# Patient Record
Sex: FEMALE | Race: WHITE | Employment: PART TIME | URBAN - METROPOLITAN AREA
[De-identification: names, ages, dates, MRNs, and addresses within clinical notes are randomized per-mention and may not be internally consistent; named-entity substitution may affect disease eponyms.]

---

## 2019-04-11 LAB
ESTRADIOL LEVEL: 7 PG/ML
FOLLICLE STIMULATING HORMONE: 17.9 MIU/ML
HCT VFR BLD CALC: 40.4 % (ref 36–48)
HEMOGLOBIN: 13.6 G/DL (ref 12–16)
MCH RBC QN AUTO: 30.8 PG (ref 26–34)
MCHC RBC AUTO-ENTMCNC: 33.7 G/DL (ref 31–36)
MCV RBC AUTO: 91.4 FL (ref 80–100)
PDW BLD-RTO: 13.1 % (ref 12.4–15.4)
PLATELET # BLD: 350 K/UL (ref 135–450)
PMV BLD AUTO: 8.9 FL (ref 5–10.5)
RBC # BLD: 4.41 M/UL (ref 4–5.2)
TSH SERPL DL<=0.05 MIU/L-ACNC: 1.67 UIU/ML (ref 0.27–4.2)
WBC # BLD: 6.5 K/UL (ref 4–11)

## 2019-04-15 LAB
HPV COMMENT: NORMAL
HPV TYPE 16: NOT DETECTED
HPV TYPE 18: NOT DETECTED
HPVOH (OTHER TYPES): NOT DETECTED

## 2022-07-20 ENCOUNTER — TELEPHONE (OUTPATIENT)
Dept: PULMONOLOGY | Age: 52
End: 2022-07-20

## 2022-07-20 NOTE — TELEPHONE ENCOUNTER
Pt was dx'ed on Monday with COVID Current sx's are chest congestion, wheezing short of breath nasal drainage and coughing up yellow-green phlegm Been to ER twice in 10 days 2 rounds of antibiotics and steroids Unfortunately she has not been seen by us yet Suggested she call her PCP to see if she might be able to be prescribed Paxlovid

## 2022-08-03 ENCOUNTER — OFFICE VISIT (OUTPATIENT)
Dept: PULMONOLOGY | Age: 52
End: 2022-08-03
Payer: COMMERCIAL

## 2022-08-03 VITALS — OXYGEN SATURATION: 98 % | HEART RATE: 79 BPM

## 2022-08-03 DIAGNOSIS — R05.3 CHRONIC COUGH: ICD-10-CM

## 2022-08-03 DIAGNOSIS — Z86.16 HISTORY OF COVID-19: ICD-10-CM

## 2022-08-03 DIAGNOSIS — J45.41 MODERATE PERSISTENT ASTHMATIC BRONCHITIS WITH ACUTE EXACERBATION: ICD-10-CM

## 2022-08-03 DIAGNOSIS — J84.9 ILD (INTERSTITIAL LUNG DISEASE) (HCC): ICD-10-CM

## 2022-08-03 DIAGNOSIS — R06.02 SOB (SHORTNESS OF BREATH): ICD-10-CM

## 2022-08-03 PROBLEM — J45.901 ASTHMATIC BRONCHITIS WITH ACUTE EXACERBATION: Status: ACTIVE | Noted: 2022-08-03

## 2022-08-03 PROCEDURE — 4004F PT TOBACCO SCREEN RCVD TLK: CPT | Performed by: INTERNAL MEDICINE

## 2022-08-03 PROCEDURE — G8421 BMI NOT CALCULATED: HCPCS | Performed by: INTERNAL MEDICINE

## 2022-08-03 PROCEDURE — G8427 DOCREV CUR MEDS BY ELIG CLIN: HCPCS | Performed by: INTERNAL MEDICINE

## 2022-08-03 PROCEDURE — 3017F COLORECTAL CA SCREEN DOC REV: CPT | Performed by: INTERNAL MEDICINE

## 2022-08-03 PROCEDURE — 99204 OFFICE O/P NEW MOD 45 MIN: CPT | Performed by: INTERNAL MEDICINE

## 2022-08-03 RX ORDER — ALBUTEROL SULFATE 2.5 MG/3ML
2.5 SOLUTION RESPIRATORY (INHALATION) EVERY 6 HOURS PRN
COMMUNITY

## 2022-08-03 RX ORDER — ALBUTEROL SULFATE 90 UG/1
2 AEROSOL, METERED RESPIRATORY (INHALATION) EVERY 6 HOURS PRN
COMMUNITY

## 2022-08-03 RX ORDER — PAROXETINE HYDROCHLORIDE 40 MG/1
40 TABLET, FILM COATED ORAL EVERY MORNING
COMMUNITY

## 2022-08-03 RX ORDER — PREDNISONE 10 MG/1
10 TABLET ORAL DAILY
Qty: 30 TABLET | Refills: 11 | Status: SHIPPED | OUTPATIENT
Start: 2022-08-03 | End: 2022-08-13

## 2022-08-03 RX ORDER — BUPROPION HYDROCHLORIDE 150 MG/1
450 TABLET ORAL EVERY MORNING
COMMUNITY

## 2022-08-03 RX ORDER — FLUTICASONE FUROATE AND VILANTEROL 200; 25 UG/1; UG/1
1 POWDER RESPIRATORY (INHALATION) DAILY
Qty: 1 EACH | Refills: 5 | Status: SHIPPED | OUTPATIENT
Start: 2022-08-03

## 2022-08-03 ASSESSMENT — ENCOUNTER SYMPTOMS
SINUS PRESSURE: 0
DIARRHEA: 0
NAUSEA: 0
ABDOMINAL PAIN: 0
CONSTIPATION: 0

## 2022-08-03 NOTE — PROGRESS NOTES
[facility]  Consult Note  Hazel Whitlock MD       Vamsi Dockery   YOB: 1970    Date of Visit:  8/3/2022    Assessment/Plan:  1. SOB (shortness of breath) & 2. Chronic cough  3. History of COVID-19/ILD  4. Moderate persistent asthmatic bronchitis with acute exacerbation    I have reviewed chest x-ray imaging. My interpretation is that she has increased interstitial markings. With 3 episodes of COVID-19, I am concerned about the possibility of interstitial lung disease related to repeated COVID-19 infection  Obtain CT chest    I think her shortness of breath, cough and wheezing are an asthma-like syndrome likely related to her repeated COVID infections. Medication management:  Prednisone 10 mg daily  Breo 200, 1 puff daily  Continue these medications until she follows up here in 1 month        Chief Complaint   Patient presents with    Cough     Back after taking last steroid on Sunday Getting up green phlegm Using nebulizer twice a day and has rescue inhaler    Wheezing     Took her last steroid on Sunday and sx's returned that night        HPI  The patient presents with a complaint of shortness of breath since about 11/21 when she had COVID. After that she also had flu. She again tested + for COVID 2/22 and 7/22. Exertion such as walking up a flight of stairs or doing her hair is a modifying factor. She has associated cough productive of green/yellow sputum. She has associated wheezing. She has had multiple rounds of Abx and steroids. Steroids seem to help the wheezing and SOB. She has no prior history of asthma. She is a lifetime nonsmoker but has some secondary smoke exposure. Family history is + for asthma in her father. Review of Systems  Review of Systems   Constitutional:  Negative for fatigue and fever. HENT:  Negative for congestion and sinus pressure. Eyes:  Negative for visual disturbance.    Cardiovascular:  Negative for chest pain and palpitations. Gastrointestinal:  Negative for abdominal pain, constipation, diarrhea and nausea. Genitourinary:  Negative for difficulty urinating. Musculoskeletal:  Negative for arthralgias. Skin:  Negative for rash. Neurological:  Negative for dizziness and light-headedness. Hematological:  Does not bruise/bleed easily. Psychiatric/Behavioral:  Negative for behavioral problems. History  I have reviewed past medical, surgical, social and family history. This is documented elsewhere in themedical record. Physical Exam:  Physical Exam  Constitutional:       General: She is not in acute distress. Appearance: She is well-developed. HENT:      Head: Normocephalic and atraumatic. Eyes:      Comments: Nasal mucosa, teeth and gums and oropharynx are clear. Neck:      Thyroid: No thyromegaly (There are no other neck masses noted. ). Vascular: No JVD. Trachea: No tracheal deviation. Cardiovascular:      Rate and Rhythm: Normal rate and regular rhythm. Heart sounds: Normal heart sounds. No murmur heard. Pulmonary:      Effort: Pulmonary effort is normal. No respiratory distress. Breath sounds: Wheezing present. No rales. Chest:      Chest wall: No tenderness. Abdominal:      General: Bowel sounds are normal. There is no distension. Palpations: Abdomen is soft. Mass: There is no hepatosplenomegaly. Tenderness: There is no abdominal tenderness. Musculoskeletal:         General: No tenderness (Muscle strength is normal and there is no obvious atrophy). Cervical back: Neck supple. Lymphadenopathy:      Cervical: No cervical adenopathy. Skin:     General: Skin is warm and dry. Findings: No rash. Psychiatric:      Comments: Oriented to person place and time       Allergies   Allergen Reactions    Bactrim [Sulfamethoxazole-Trimethoprim]      Prior to Visit Medications    Medication Sig Taking?  Authorizing Provider   buPROPion [facility] XL) 150 MG extended release tablet Take 450 mg by mouth every morning Yes Historical Provider, MD   PARoxetine (PAXIL) 40 MG tablet Take 40 mg by mouth every morning Yes Historical Provider, MD   albuterol (PROVENTIL) (2.5 MG/3ML) 0.083% nebulizer solution Take 2.5 mg by nebulization every 6 hours as needed for Wheezing Yes Historical Provider, MD   albuterol sulfate HFA (VENTOLIN HFA) 108 (90 Base) MCG/ACT inhaler Inhale 2 puffs into the lungs every 6 hours as needed for Wheezing Yes Historical Provider, MD       Vitals:    08/03/22 0909   Pulse: 79   SpO2: 98%     There is no height or weight on file to calculate BMI.      Wt Readings from Last 3 Encounters:   No data found for Wt     BP Readings from Last 3 Encounters:   No data found for BP        Social History     Tobacco Use   Smoking Status Never   Smokeless Tobacco Not on file

## 2022-08-31 ENCOUNTER — OFFICE VISIT (OUTPATIENT)
Dept: PULMONOLOGY | Age: 52
End: 2022-08-31
Payer: COMMERCIAL

## 2022-08-31 VITALS — OXYGEN SATURATION: 100 % | HEART RATE: 82 BPM

## 2022-08-31 DIAGNOSIS — R06.02 SOB (SHORTNESS OF BREATH): Primary | ICD-10-CM

## 2022-08-31 DIAGNOSIS — J45.40 MODERATE PERSISTENT ASTHMA, UNCOMPLICATED: ICD-10-CM

## 2022-08-31 DIAGNOSIS — R05.3 CHRONIC COUGH: ICD-10-CM

## 2022-08-31 PROCEDURE — 99214 OFFICE O/P EST MOD 30 MIN: CPT | Performed by: INTERNAL MEDICINE

## 2022-08-31 PROCEDURE — 1036F TOBACCO NON-USER: CPT | Performed by: INTERNAL MEDICINE

## 2022-08-31 PROCEDURE — G8427 DOCREV CUR MEDS BY ELIG CLIN: HCPCS | Performed by: INTERNAL MEDICINE

## 2022-08-31 PROCEDURE — 3017F COLORECTAL CA SCREEN DOC REV: CPT | Performed by: INTERNAL MEDICINE

## 2022-08-31 PROCEDURE — G8421 BMI NOT CALCULATED: HCPCS | Performed by: INTERNAL MEDICINE

## 2022-08-31 NOTE — PROGRESS NOTES
[facility]  Consult Note  Selene Watson MD       Vamsi Dockery   YOB: 1970    Date of Visit:  8/31/2022    Assessment/Plan:  1. SOB (shortness of breath) & 2. Chronic cough  3. Moderate persistent asthmatic bronchitis with acute exacerbation    I have reviewed CT imaging. My interpretation is that CT chest is clear. With 3 episodes of COVID-19, I am concerned about the possibility of interstitial lung disease related to repeated COVID-19 infection but CT did not bear this out. Have her stop the Prednisone but continue the Breo  Can consider stopping Breo when she comes back in 6 months if she remains stable. 6 months      Chief Complaint   Patient presents with    Cough     1 month f.u. Had her CT done was not able to get enough phlegm up for sputum cx        HPI  The patient presents with a complaint of shortness of breath since about 11/21 when she had COVID. After that she also had flu. She again tested + for COVID 2/22 and 7/22. Exertion such as walking up a flight of stairs or doing her hair is a modifying factor. She is better with the Prednisone and Breo. She had her CT chest.     Review of Systems  No Chest pain, Nausea or vomiting reported      History  I have reviewed past medical, surgical, social and family history. This is documented elsewhere in themedical record. Physical Exam:  Well developed, well nourished  Alert and oriented  Sclera is clear  No cervical adenopathy  No JVD. Chest examination is clear. Cardiac examination reveals regular rate and rhythm without murmur, gallop or rub. The abdomen is soft, nontender and nondistended. There is no clubbing, cyanosis or edema of the extremities. There is no obvious skin rash. No focal neuro deficicts  Normal mood and affect      Allergies   Allergen Reactions    Bactrim [Sulfamethoxazole-Trimethoprim]      Prior to Visit Medications    Medication Sig Taking?  Authorizing Provider   buPROPion (WELLBUTRIN XL) 150 MG extended release tablet Take 450 mg by mouth every morning  Historical Provider, MD   PARoxetine (PAXIL) 40 MG tablet Take 40 mg by mouth every morning  Historical Provider, MD   albuterol (PROVENTIL) (2.5 MG/3ML) 0.083% nebulizer solution Take 2.5 mg by nebulization every 6 hours as needed for Wheezing  Historical Provider, MD   albuterol sulfate HFA (VENTOLIN HFA) 108 (90 Base) MCG/ACT inhaler Inhale 2 puffs into the lungs every 6 hours as needed for Wheezing  Historical Provider, MD   Fluticasone furoate-vilanterol (BREO ELLIPTA) 200-25 MCG/INH AEPB inhaler Inhale 1 puff into the lungs in the morning. Gilford Sia, MD       Vitals:    08/31/22 1011   Pulse: 82   SpO2: 100%     There is no height or weight on file to calculate BMI.      Wt Readings from Last 3 Encounters:   No data found for Wt     BP Readings from Last 3 Encounters:   No data found for BP        Social History     Tobacco Use   Smoking Status Never   Smokeless Tobacco Never

## 2023-01-03 ENCOUNTER — TELEPHONE (OUTPATIENT)
Dept: PULMONOLOGY | Age: 53
End: 2023-01-03

## 2023-01-03 NOTE — TELEPHONE ENCOUNTER
Called pt and got her [place] information : PG#[id number]  Provrider # [phone] to try and do PA and given another # to call [phone] Called this # and was able to get Rubén Robbins approved thru pt's new insurance Auth # [id number] good from 12-4-22 thru 1-3-2024 Pt and [facility] [phone]

## 2023-01-03 NOTE — TELEPHONE ENCOUNTER
Patient left  and said her [employer] is being denied she switched insurances and is now with [employer] and she is needing a prior auth to be done. She is completely out of inhaler.      Masha 30: [phone]

## 2023-03-01 ENCOUNTER — OFFICE VISIT (OUTPATIENT)
Dept: PULMONOLOGY | Age: 53
End: 2023-03-01
Payer: COMMERCIAL

## 2023-03-01 DIAGNOSIS — J45.40 MODERATE PERSISTENT ASTHMA, UNCOMPLICATED: Primary | ICD-10-CM

## 2023-03-01 PROCEDURE — 99213 OFFICE O/P EST LOW 20 MIN: CPT | Performed by: INTERNAL MEDICINE

## 2023-03-01 PROCEDURE — 1036F TOBACCO NON-USER: CPT | Performed by: INTERNAL MEDICINE

## 2023-03-01 PROCEDURE — G8427 DOCREV CUR MEDS BY ELIG CLIN: HCPCS | Performed by: INTERNAL MEDICINE

## 2023-03-01 PROCEDURE — 3017F COLORECTAL CA SCREEN DOC REV: CPT | Performed by: INTERNAL MEDICINE

## 2023-03-01 PROCEDURE — G8484 FLU IMMUNIZE NO ADMIN: HCPCS | Performed by: INTERNAL MEDICINE

## 2023-03-01 PROCEDURE — G8421 BMI NOT CALCULATED: HCPCS | Performed by: INTERNAL MEDICINE

## 2023-03-01 NOTE — PROGRESS NOTES
[facility]  Consult Note  Bronwyn Ryan MD       Vamsi Dockery   YOB: 1970    Date of Visit:  3/1/2023    Assessment/Plan:  1. SOB (shortness of breath) & 2. Chronic cough  3. Moderate persistent asthmatic bronchitis uncomplicated    She has been able to remain off prednisone  Carline Raisa has been effective in maintaining good control. Continue this medication  Albuterol as needed    6 months      No chief complaint on file. HPI  The patient presents with a complaint of shortness of breath since about 11/21 when she had COVID. After that she also had flu. She again tested + for COVID 2/22 and 7/22. Exertion such as walking up a flight of stairs or doing her hair is a modifying factor. She is better with the Prednisone and Breo. She had her CT chest.     Review of Systems  No Chest pain, Nausea or vomiting reported      History  I have reviewed past medical, surgical, social and family history. This is documented elsewhere in themedical record. Physical Exam:  Well developed, well nourished  Alert and oriented  Sclera is clear  No cervical adenopathy  No JVD. Chest examination is clear. Cardiac examination reveals regular rate and rhythm without murmur, gallop or rub. The abdomen is soft, nontender and nondistended. There is no clubbing, cyanosis or edema of the extremities. There is no obvious skin rash. No focal neuro deficicts  Normal mood and affect      Allergies   Allergen Reactions    Bactrim [Sulfamethoxazole-Trimethoprim]      Prior to Visit Medications    Medication Sig Taking?  Authorizing Provider   buPROPion (WELLBUTRIN XL) 150 MG extended release tablet Take 450 mg by mouth every morning  Historical Provider, MD   PARoxetine (PAXIL) 40 MG tablet Take 40 mg by mouth every morning  Historical Provider, MD   albuterol (PROVENTIL) (2.5 MG/3ML) 0.083% nebulizer solution Take 2.5 mg by nebulization every 6 hours as needed for Wheezing Historical Provider, MD   albuterol sulfate HFA (VENTOLIN HFA) 108 (90 Base) MCG/ACT inhaler Inhale 2 puffs into the lungs every 6 hours as needed for Wheezing  Historical Provider, MD   Fluticasone furoate-vilanterol (BREO ELLIPTA) 200-25 MCG/INH AEPB inhaler Inhale 1 puff into the lungs in the morning. Chay Matute MD       There were no vitals filed for this visit. There is no height or weight on file to calculate BMI.      Wt Readings from Last 3 Encounters:   No data found for Wt     BP Readings from Last 3 Encounters:   No data found for BP        Social History     Tobacco Use   Smoking Status Never   Smokeless Tobacco Never

## 2023-03-23 ENCOUNTER — TELEPHONE (OUTPATIENT)
Dept: PULMONOLOGY | Age: 53
End: 2023-03-23

## 2023-03-23 RX ORDER — FLUTICASONE FUROATE AND VILANTEROL 200; 25 UG/1; UG/1
POWDER RESPIRATORY (INHALATION)
Qty: 180 EACH | OUTPATIENT
Start: 2023-03-23

## 2023-03-23 NOTE — TELEPHONE ENCOUNTER
Pt called and needs refills on:    Fluticasone furoate-vilanterol (BREO ELLIPTA) 200-25 MCG/INH AEPB inhaler       Send to:    [id number] - [address] [facility] [place]   [address] [place] [address],[address] [id number]   Phone:  [phone]    Fax:  [phone]

## 2023-12-20 ENCOUNTER — TELEPHONE (OUTPATIENT)
Dept: PULMONOLOGY | Age: 53
End: 2023-12-20

## 2023-12-20 NOTE — TELEPHONE ENCOUNTER
Submitted PA for [employer]  [facility] Key: [id number]  STATUS: PENDING.    Follow up done daily; if no decision with in three days we will refax.  If another three days goes by with no decision will call the insurance for status.